# Patient Record
Sex: MALE | Race: WHITE | URBAN - METROPOLITAN AREA
[De-identification: names, ages, dates, MRNs, and addresses within clinical notes are randomized per-mention and may not be internally consistent; named-entity substitution may affect disease eponyms.]

---

## 2022-06-09 ENCOUNTER — OFFICE VISIT (OUTPATIENT)
Dept: URBAN - METROPOLITAN AREA CLINIC 7 | Facility: CLINIC | Age: 22
End: 2022-06-09
Payer: COMMERCIAL

## 2022-06-09 DIAGNOSIS — H52.13 BILATERAL MYOPIA: Primary | ICD-10-CM

## 2022-06-09 PROCEDURE — 99203 OFFICE O/P NEW LOW 30 MIN: CPT | Performed by: OPHTHALMOLOGY

## 2022-06-09 ASSESSMENT — INTRAOCULAR PRESSURE
OS: 15
OD: 14

## 2022-06-09 NOTE — IMPRESSION/PLAN
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 Bilateral. Plan: Patient presents for evaluation of retinal hemorrhages in left eye between optic nerve and central macula. Carefule examination of the retinal revealed no retinal hemorrhages OU. Discussed findings. Patient reassured. Follow up with Dr. Kaitlin Clancy for routine monitoring. Discussed increased risk for developing a retinal tear or retinal detachment secondary to myopia. Advised patient to contact us immediately for any new floaters, flashing lights, or a shadow in the vision. 

Return PRN, OCT OU